# Patient Record
Sex: MALE | Race: WHITE | NOT HISPANIC OR LATINO
[De-identification: names, ages, dates, MRNs, and addresses within clinical notes are randomized per-mention and may not be internally consistent; named-entity substitution may affect disease eponyms.]

---

## 2017-08-22 ENCOUNTER — FORM ENCOUNTER (OUTPATIENT)
Age: 62
End: 2017-08-22

## 2017-08-23 ENCOUNTER — APPOINTMENT (OUTPATIENT)
Dept: UROLOGY | Facility: CLINIC | Age: 62
End: 2017-08-23
Payer: MEDICARE

## 2017-08-23 ENCOUNTER — OUTPATIENT (OUTPATIENT)
Dept: OUTPATIENT SERVICES | Facility: HOSPITAL | Age: 62
LOS: 1 days | End: 2017-08-23
Payer: MEDICARE

## 2017-08-23 ENCOUNTER — APPOINTMENT (OUTPATIENT)
Dept: CT IMAGING | Facility: IMAGING CENTER | Age: 62
End: 2017-08-23
Payer: MEDICARE

## 2017-08-23 ENCOUNTER — APPOINTMENT (OUTPATIENT)
Dept: RADIOLOGY | Facility: IMAGING CENTER | Age: 62
End: 2017-08-23
Payer: MEDICARE

## 2017-08-23 DIAGNOSIS — R31.29 OTHER MICROSCOPIC HEMATURIA: ICD-10-CM

## 2017-08-23 DIAGNOSIS — Z00.8 ENCOUNTER FOR OTHER GENERAL EXAMINATION: ICD-10-CM

## 2017-08-23 PROCEDURE — 74000: CPT | Mod: 26

## 2017-08-23 PROCEDURE — 74176 CT ABD & PELVIS W/O CONTRAST: CPT | Mod: 26

## 2017-08-23 PROCEDURE — 74018 RADEX ABDOMEN 1 VIEW: CPT

## 2017-08-23 PROCEDURE — 74176 CT ABD & PELVIS W/O CONTRAST: CPT

## 2017-08-23 PROCEDURE — 99213 OFFICE O/P EST LOW 20 MIN: CPT

## 2017-08-28 LAB
APPEARANCE: CLEAR
BACTERIA UR CULT: NORMAL
BACTERIA: NEGATIVE
BILIRUBIN URINE: NEGATIVE
BLOOD URINE: ABNORMAL
COLOR: YELLOW
GLUCOSE QUALITATIVE U: NORMAL MG/DL
HYALINE CASTS: 0 /LPF
KETONES URINE: NEGATIVE
LEUKOCYTE ESTERASE URINE: NEGATIVE
MICROSCOPIC-UA: NORMAL
NITRITE URINE: NEGATIVE
PH URINE: 7
PROTEIN URINE: NEGATIVE MG/DL
PSA SERPL-MCNC: 1.98 NG/ML
RED BLOOD CELLS URINE: 4 /HPF
SPECIFIC GRAVITY URINE: 1.01
SQUAMOUS EPITHELIAL CELLS: 0 /HPF
UROBILINOGEN URINE: NORMAL MG/DL
WHITE BLOOD CELLS URINE: 1 /HPF

## 2017-10-04 ENCOUNTER — APPOINTMENT (OUTPATIENT)
Dept: UROLOGY | Facility: CLINIC | Age: 62
End: 2017-10-04
Payer: MEDICARE

## 2017-10-04 DIAGNOSIS — N20.1 CALCULUS OF URETER: ICD-10-CM

## 2017-10-04 PROCEDURE — 99213 OFFICE O/P EST LOW 20 MIN: CPT

## 2017-11-08 ENCOUNTER — APPOINTMENT (OUTPATIENT)
Dept: UROLOGY | Facility: CLINIC | Age: 62
End: 2017-11-08

## 2020-06-17 ENCOUNTER — APPOINTMENT (OUTPATIENT)
Dept: UROLOGY | Facility: CLINIC | Age: 65
End: 2020-06-17
Payer: MEDICARE

## 2020-06-17 VITALS — DIASTOLIC BLOOD PRESSURE: 88 MMHG | HEART RATE: 76 BPM | SYSTOLIC BLOOD PRESSURE: 142 MMHG

## 2020-06-17 VITALS — TEMPERATURE: 98.1 F

## 2020-06-17 DIAGNOSIS — N20.0 CALCULUS OF KIDNEY: ICD-10-CM

## 2020-06-17 DIAGNOSIS — N28.9 DISORDER OF KIDNEY AND URETER, UNSPECIFIED: ICD-10-CM

## 2020-06-17 PROCEDURE — 99214 OFFICE O/P EST MOD 30 MIN: CPT

## 2020-06-17 NOTE — PHYSICAL EXAM
[General Appearance - In No Acute Distress] : no acute distress [Exaggerated Use Of Accessory Muscles For Inspiration] : no accessory muscle use [Abdomen Soft] : soft [Normal Station and Gait] : the gait and station were normal for the patient's age [FreeTextEntry1] : blind [Oriented To Time, Place, And Person] : oriented to person, place, and time

## 2020-06-17 NOTE — ASSESSMENT
[FreeTextEntry1] : Constipation. \par --Second opinion with GI\par \par Hx of stones. Last imaging with non obstructing stone and small incompletely characterized renal lesion. \par --Renal US in Jan to ensure no changes\par --D/c vitamin C (taking to prevent covid)\par --Increase fluid intake\par --Increase citrate intake

## 2020-06-17 NOTE — HISTORY OF PRESENT ILLNESS
[FreeTextEntry1] : 66yo gentleman with cc of constipation. Pt has been seen by Dr Galvan in the past for stones. Pt has had issues with defecation and has been taking a lot of magnesium to help with this. He has been seeing GI (Dr Moreland). His primary complaints today are all about managing his bowels. He had a colonoscopy that was normal. \par \par He had abd US in Jan that showed non obstructing RLP 1.3cm stone. Pt also with 1.4cm lesion in kidney incompletely characterized. Reviewed his 24h urine which showed ok volumes and borderline citrate back in 2017. He reports passing several stones in lifetime and none in several years and is happy with this.  Pt taking vitamin C to "prevent covid"

## 2020-07-06 ENCOUNTER — APPOINTMENT (OUTPATIENT)
Dept: GASTROENTEROLOGY | Facility: CLINIC | Age: 65
End: 2020-07-06
Payer: MEDICARE

## 2020-07-06 VITALS
WEIGHT: 157 LBS | HEIGHT: 67 IN | HEART RATE: 78 BPM | DIASTOLIC BLOOD PRESSURE: 85 MMHG | TEMPERATURE: 97.4 F | BODY MASS INDEX: 24.64 KG/M2 | SYSTOLIC BLOOD PRESSURE: 130 MMHG | OXYGEN SATURATION: 94 %

## 2020-07-06 DIAGNOSIS — K59.00 CONSTIPATION, UNSPECIFIED: ICD-10-CM

## 2020-07-06 PROCEDURE — 99202 OFFICE O/P NEW SF 15 MIN: CPT

## 2020-07-06 NOTE — CONSULT LETTER
[Dear  ___] : Dear  [unfilled], [Consult Letter:] : I had the pleasure of evaluating your patient, [unfilled]. [Please see my note below.] : Please see my note below. [Consult Closing:] : Thank you very much for allowing me to participate in the care of this patient.  If you have any questions, please do not hesitate to contact me. [Sincerely,] : Sincerely, [FreeTextEntry3] : Reagan Carty MD\par Division of Gastroenterology\par [facility]\par

## 2020-07-06 NOTE — PHYSICAL EXAM
[General Appearance - Alert] : alert [General Appearance - In No Acute Distress] : in no acute distress [Outer Ear] : the ears and nose were normal in appearance [Exaggerated Use Of Accessory Muscles For Inspiration] : no accessory muscle use [Heart Rate And Rhythm] : heart rate was normal and rhythm regular [Edema] : there was no peripheral edema [Bowel Sounds] : normal bowel sounds [Abdomen Soft] : soft [Abdomen Tenderness] : non-tender [Normal Sphincter Tone] : normal sphincter tone [Abdomen Mass (___ Cm)] : no abdominal mass palpated [No Rectal Mass] : no rectal mass [Skin Color & Pigmentation] : normal skin color and pigmentation [Involuntary Movements] : no involuntary movements were seen [] : no rash [Oriented To Time, Place, And Person] : oriented to person, place, and time [Affect] : the affect was normal [Mood] : the mood was normal [FreeTextEntry1] : enlarged neck, no visible masses [External Hemorrhoid] : no external hemorrhoids

## 2020-07-06 NOTE — REASON FOR VISIT
[Consultation] : a consultation visit [Formal Caregiver] : formal caregiver [FreeTextEntry1] : constipation

## 2020-07-06 NOTE — HISTORY OF PRESENT ILLNESS
[FreeTextEntry1] : 65M with PMH of blindness, GOMEZ, nephrolithiasis, chronic constipation (following with Dr. Moreland at [place]), referred by Dr. Albertina Hagan for a second opinion for constipation management.\par \par Patient was recently seen by his urologist, Dr. Hagan, and complained of constipation despite taking metamucil once daily with some improvement. Reports currently having 1-2 BM daily, but difficult to evacuate. Sometimes will need to due self-disimpaction. Previously took Miralax; sometimes too loose while taking daily. Unaware of blood in stool (states never told of blood and otherwise is blind so cannot see his stool color). Sometimes abdominal pain, which improves after having bowel movement. Has rare nausea, which he also associated with times of worsening constipation. No vomiting. Currently is eating a pureed diet while undergoing dental work. \par \par Patient also notes sometimes take "a lot of magnesium" to assist in constipation management. \par \valerie Had CT colonography 5/2018 with evidence of extensive diverticular disease of the colon without acute diverticulitis, cholelithiasis, renal calculi, probable right renal cyst, mildly enlarged prostate, and fat-containing left inguinal hernia. Was told to repeat CTC in 5 years. No prior colonoscopy. Was told not to have anesthesia because of his GOMEZ.

## 2020-07-06 NOTE — ASSESSMENT
[FreeTextEntry1] : 65M with PMH of blindness, GOMEZ, nephrolithiasis, chronic constipation (following with Dr. Moreland at [place]), referred by Dr. Albertina Hagan for a second opinion for constipation management.\par \par #Constipation: Suspect slow-transit. Mild improvement reported with daily fiber supplementation. Reports improvement with Miralax in past, but stopped after loose stools (at time was not taking fiber). \par --Discussed continued fiber supplementation and uptitration should be increased slowly to prevent gas and cramping, and He should ensure adequate hydration. Can continue with Metamucil [begin with 1 tablespoon (3.5 grams fiber) once per day; may increase to 1 tablespoon three times per day]\par --Can also take Miralax in addition to fiber supplementation, can begin with every other day and adjust dosing as needed\par --Pending course and response to treatment, can determine need for ARM/motility testing and/or endoscopic evaluation\par \par RTO 3 months

## 2020-10-05 ENCOUNTER — APPOINTMENT (OUTPATIENT)
Dept: GASTROENTEROLOGY | Facility: CLINIC | Age: 65
End: 2020-10-05

## 2024-04-03 ENCOUNTER — APPOINTMENT (OUTPATIENT)
Dept: PULMONOLOGY | Facility: CLINIC | Age: 69
End: 2024-04-03
Payer: MEDICARE

## 2024-04-03 VITALS
HEART RATE: 74 BPM | SYSTOLIC BLOOD PRESSURE: 131 MMHG | WEIGHT: 157 LBS | TEMPERATURE: 98.3 F | BODY MASS INDEX: 24.64 KG/M2 | OXYGEN SATURATION: 93 % | HEIGHT: 67 IN | DIASTOLIC BLOOD PRESSURE: 86 MMHG

## 2024-04-03 PROCEDURE — 99204 OFFICE O/P NEW MOD 45 MIN: CPT

## 2024-04-03 NOTE — PHYSICAL EXAM
[General Appearance - Well Developed] : well developed [General Appearance - Well Nourished] : well nourished [] : no respiratory distress [Exaggerated Use Of Accessory Muscles For Inspiration] : no accessory muscle use [Oriented To Time, Place, And Person] : oriented to person, place, and time [Affect] : the affect was normal

## 2024-04-05 NOTE — REVIEW OF SYSTEMS
[EDS: ESS=____] : daytime somnolence: ESS=[unfilled] [Fatigue] : fatigue [Snoring] : snoring [Negative] : Psychiatric [Lower Extremity Discomfort] : no lower extremity discomfort [Late day/ Evening symptoms] : no late day/evening symptoms [Unusual Sleep Behavior] : no unusual sleep behavior [Hypersomnolence] : not sleeping much more than usual [Cataplexy] :  no cataplexy

## 2024-04-05 NOTE — HISTORY OF PRESENT ILLNESS
[FreeTextEntry1] : The patient is a 69 year old M with PMHx of GOMEZ on BiPAP, deviated septum, who is legally blind (congenital cataracts), following up today to establish care for GOMEZ as prior sleep medicine doctor became too expensive. States he was diagnosed with GOMEZ 20 years ago and has been using BiPAP since. Has been snoring and is worried his machine is not efficacious. Machine is from 2017. Does not know if he has witnessed apneas. Patient thinks he has N24 but has never been formally diagnosed. His sleep schedule is not bothersome to him.    DME: [facility] Mask: F20 FF in Medium   [ESS] : 3

## 2024-04-05 NOTE — END OF VISIT
[FreeTextEntry3] :   I, Lorena Nettles MD, personally performed the evaluation and management (E/M) services for this patient. That E/M includes conducting the clinically appropriate initial history &/or exam, assessing all conditions, and establishing the plan of care. I have also independently reviewed the medical records and imaging at the time of this office visit, and discussed the above mentioned images with interpretations with the patient. Today, ROMINA Malone was here to participate in the visit & follow plan of care established by me.

## 2024-04-05 NOTE — ASSESSMENT
[FreeTextEntry1] : The patient is a 69-year-old M with PMHx of GOMEZ on BiPAP, deviated septum, who is legally blind (congenital cataracts and glaucoma), following up today to establish care for GOMEZ. He is not able to sleep without PAP. Believes he has N24 but has never been formally diagnosed; his sleep schedule is not bothersome to him; unclear if his rods/cones are intact. Feels machine may not be efficacious as he is still snoring. Will connect patient's machine to the [facility] [employer] account to view compliance/therapy data. Split PSG w/ CPAP titration ordered today given that his equipment is very old.  Follow-up after study.

## 2024-04-08 ENCOUNTER — OUTPATIENT (OUTPATIENT)
Dept: OUTPATIENT SERVICES | Facility: HOSPITAL | Age: 69
LOS: 1 days | End: 2024-04-08
Payer: MEDICARE

## 2024-04-08 ENCOUNTER — APPOINTMENT (OUTPATIENT)
Dept: SLEEP CENTER | Facility: HOSPITAL | Age: 69
End: 2024-04-08

## 2024-04-08 DIAGNOSIS — G47.33 OBSTRUCTIVE SLEEP APNEA (ADULT) (PEDIATRIC): ICD-10-CM

## 2024-04-08 PROCEDURE — 95811 POLYSOM 6/>YRS CPAP 4/> PARM: CPT | Mod: 26

## 2024-04-08 PROCEDURE — 95811 POLYSOM 6/>YRS CPAP 4/> PARM: CPT

## 2024-05-09 ENCOUNTER — APPOINTMENT (OUTPATIENT)
Dept: PULMONOLOGY | Facility: CLINIC | Age: 69
End: 2024-05-09
Payer: MEDICARE

## 2024-05-09 DIAGNOSIS — G47.33 OBSTRUCTIVE SLEEP APNEA (ADULT) (PEDIATRIC): ICD-10-CM

## 2024-05-09 DIAGNOSIS — R06.83 SNORING: ICD-10-CM

## 2024-05-09 PROCEDURE — 99443: CPT

## 2024-05-09 PROCEDURE — G2211 COMPLEX E/M VISIT ADD ON: CPT | Mod: NC,1L

## 2024-05-10 PROBLEM — R06.83 SNORING: Status: ACTIVE | Noted: 2024-04-05

## 2024-05-10 PROBLEM — G47.33 OBSTRUCTIVE SLEEP APNEA SYNDROME: Status: ACTIVE | Noted: 2024-04-03

## 2024-05-10 NOTE — ASSESSMENT
[FreeTextEntry1] : REVIEWED [facility] Data 2/2024-5/2024: Average use of 9 hours 33 minutes,  99% adherence of >4 hours per night of use, therapeutic AHI of 7.4,95th percentile pressure 9.5, and mild leak Split Diagnostic and Therapeutic PSG 4/2024: AHI 89.5 (AASM); AHI 80.3 (CMS); t88 13.4 minutes. CPAP at pressure of 9 cm H2O effective, F20 medium FFM used   The patient is a 69-year-old M with PMHx of GOMEZ on BiPAP, deviated septum, who is legally blind (congenital cataracts and glaucoma), following up today to establish care for GOMEZ. He is not able to sleep without PAP. Believes he has N24 but has never been formally diagnosed; his sleep schedule is not bothersome to him; unclear if his rods/cones are intact. Current machine is old but efficacy and adherence are at goal. Repeat testing showed severe GOMEZ.Ordered APAP, set 5 to 20 cm H2O with a home mask fitting. DME is [facility]. The benefits of GOMEZ treatment in improving cardiac, neurologic, cognitive, and mortality outcomes were discussed. Adherence goal is at least 4 hours per night on 70% of nights with an AHI of less than 10 events per hour. The ramifications of GOMEZ and its treatment were discussed in detail. Follow up within 10 weeks of use.

## 2024-05-10 NOTE — PHYSICAL EXAM
[FreeTextEntry1] :  *****full physical exam could not be performed due to this being a TeleHealth visit.

## 2024-05-10 NOTE — HISTORY OF PRESENT ILLNESS
[Home] : at home, [unfilled] , at the time of the visit. [Medical Office: ([facility])___] : at the medical office located in  [Verbal consent obtained from patient] : the patient, [unfilled] [FreeTextEntry1] : The patient is a 69 year old M with PMHx of GOMEZ on BiPAP, deviated septum, who is legally blind (congenital cataracts), following up today to establish care for GOMEZ as prior sleep medicine doctor became too expensive. States he was diagnosed with GOMEZ 20 years ago and has been using BiPAP since. Has been snoring and is worried his machine is not efficacious. Machine is from 2017. Does not know if he has witnessed apneas. Patient thinks he has N24 but has never been formally diagnosed. His sleep schedule is not bothersome to him.  5/9/2024 Had HST. Continues to use his current old PAP.  DME: [facility] Mask: F20 FF in Medium [ESS] : 3

## 2024-06-12 ENCOUNTER — APPOINTMENT (OUTPATIENT)
Dept: OTOLARYNGOLOGY | Facility: CLINIC | Age: 69
End: 2024-06-12

## 2024-07-16 ENCOUNTER — APPOINTMENT (OUTPATIENT)
Dept: PULMONOLOGY | Facility: CLINIC | Age: 69
End: 2024-07-16
Payer: MEDICARE

## 2024-07-16 VITALS
WEIGHT: 174 LBS | BODY MASS INDEX: 27.31 KG/M2 | HEART RATE: 81 BPM | DIASTOLIC BLOOD PRESSURE: 87 MMHG | HEIGHT: 67 IN | SYSTOLIC BLOOD PRESSURE: 152 MMHG | OXYGEN SATURATION: 94 % | TEMPERATURE: 98 F

## 2024-07-16 DIAGNOSIS — G47.33 OBSTRUCTIVE SLEEP APNEA (ADULT) (PEDIATRIC): ICD-10-CM

## 2024-07-16 PROCEDURE — G2211 COMPLEX E/M VISIT ADD ON: CPT

## 2024-07-16 PROCEDURE — 99214 OFFICE O/P EST MOD 30 MIN: CPT

## 2024-09-30 ENCOUNTER — APPOINTMENT (OUTPATIENT)
Dept: OTOLARYNGOLOGY | Facility: CLINIC | Age: 69
End: 2024-09-30
Payer: MEDICARE

## 2024-09-30 VITALS
BODY MASS INDEX: 27.31 KG/M2 | TEMPERATURE: 97.8 F | HEART RATE: 80 BPM | SYSTOLIC BLOOD PRESSURE: 150 MMHG | OXYGEN SATURATION: 95 % | WEIGHT: 174 LBS | HEIGHT: 67 IN | DIASTOLIC BLOOD PRESSURE: 83 MMHG

## 2024-09-30 DIAGNOSIS — H61.23 IMPACTED CERUMEN, BILATERAL: ICD-10-CM

## 2024-09-30 DIAGNOSIS — G47.33 OBSTRUCTIVE SLEEP APNEA (ADULT) (PEDIATRIC): ICD-10-CM

## 2024-09-30 DIAGNOSIS — H90.3 SENSORINEURAL HEARING LOSS, BILATERAL: ICD-10-CM

## 2024-09-30 DIAGNOSIS — Z80.9 FAMILY HISTORY OF MALIGNANT NEOPLASM, UNSPECIFIED: ICD-10-CM

## 2024-09-30 PROCEDURE — 92557 COMPREHENSIVE HEARING TEST: CPT

## 2024-09-30 PROCEDURE — 92550 TYMPANOMETRY & REFLEX THRESH: CPT

## 2024-09-30 PROCEDURE — 69210 REMOVE IMPACTED EAR WAX UNI: CPT

## 2024-09-30 PROCEDURE — 99204 OFFICE O/P NEW MOD 45 MIN: CPT | Mod: 25

## 2024-09-30 NOTE — DATA REVIEWED
[de-identified] : 9/24: mild to mod-sev SNHL AD, mild to severe SNHL AS; WR 90/100 - Immitance testing w/ type A AU [de-identified] : 4/24 split-night: AHI 80.3; LSat 83%

## 2024-09-30 NOTE — HISTORY OF PRESENT ILLNESS
[de-identified] : Has bilateral hearing aids which aren't working well for him; he would like new ones. Has a hx of frequent ear impactions. Denies: ear pain, drainage, frequent loud noise exp/shooting, frequent infections, hx of ear surgery or IV antibiotics/chemo; his father had hearing loss. Qtip use: no Known GOMEZ on APAP which he tolerates ok.  Blind from a very early age- he thinks he had a syndrome but doesn't know what.

## 2024-09-30 NOTE — ASSESSMENT
[FreeTextEntry1] : HAE ordered as requested. RTC for an ear cleaning in 6 months; annual audios, sooner prn any changes.  I encouraged him to comply w/ his CPAP

## 2024-09-30 NOTE — HISTORY OF PRESENT ILLNESS
[de-identified] : Has bilateral hearing aids which aren't working well for him; he would like new ones. Has a hx of frequent ear impactions. Denies: ear pain, drainage, frequent loud noise exp/shooting, frequent infections, hx of ear surgery or IV antibiotics/chemo; his father had hearing loss. Qtip use: no Known GOMEZ on APAP which he tolerates ok.  Blind from a very early age- he thinks he had a syndrome but doesn't know what.

## 2024-09-30 NOTE — DATA REVIEWED
[de-identified] : 9/24: mild to mod-sev SNHL AD, mild to severe SNHL AS; WR 90/100 - Immitance testing w/ type A AU [de-identified] : 4/24 split-night: AHI 80.3; LSat 83%

## 2024-09-30 NOTE — PHYSICAL EXAM
[Binocular Microscopic Exam] : Binocular microscopic exam was performed [FreeTextEntry8] : Obstructing cerumen was removed with a hook [FreeTextEntry9] : Obstructing cerumen was removed with a hook [] : septum deviated bilaterally [Normal] : no masses and lesions seen, face is symmetric